# Patient Record
Sex: MALE | Race: WHITE | Employment: FULL TIME | URBAN - METROPOLITAN AREA
[De-identification: names, ages, dates, MRNs, and addresses within clinical notes are randomized per-mention and may not be internally consistent; named-entity substitution may affect disease eponyms.]

---

## 2023-12-05 ENCOUNTER — APPOINTMENT (EMERGENCY)
Dept: RADIOLOGY | Facility: HOSPITAL | Age: 43
End: 2023-12-05
Payer: OTHER MISCELLANEOUS

## 2023-12-05 ENCOUNTER — HOSPITAL ENCOUNTER (EMERGENCY)
Facility: HOSPITAL | Age: 43
Discharge: HOME | End: 2023-12-05
Attending: EMERGENCY MEDICINE
Payer: OTHER MISCELLANEOUS

## 2023-12-05 ENCOUNTER — TELEPHONE (OUTPATIENT)
Age: 43
End: 2023-12-05

## 2023-12-05 ENCOUNTER — APPOINTMENT (OUTPATIENT)
Dept: URGENT CARE | Facility: CLINIC | Age: 43
End: 2023-12-05
Payer: OTHER MISCELLANEOUS

## 2023-12-05 VITALS
DIASTOLIC BLOOD PRESSURE: 84 MMHG | BODY MASS INDEX: 26.41 KG/M2 | HEART RATE: 66 BPM | TEMPERATURE: 98.3 F | WEIGHT: 195 LBS | HEIGHT: 72 IN | SYSTOLIC BLOOD PRESSURE: 184 MMHG | RESPIRATION RATE: 16 BRPM | OXYGEN SATURATION: 100 %

## 2023-12-05 DIAGNOSIS — S60.10XA SUBUNGUAL HEMATOMA OF FINGER, INITIAL ENCOUNTER: Primary | ICD-10-CM

## 2023-12-05 DIAGNOSIS — M79.644 PAIN OF RIGHT THUMB: ICD-10-CM

## 2023-12-05 DIAGNOSIS — S61.309A AVULSION OF FINGERNAIL, INITIAL ENCOUNTER: ICD-10-CM

## 2023-12-05 DIAGNOSIS — R03.0 ELEVATED BLOOD PRESSURE READING: ICD-10-CM

## 2023-12-05 PROCEDURE — 11740 EVACUATION SUBUNGUAL HMTMA: CPT | Performed by: EMERGENCY MEDICINE

## 2023-12-05 PROCEDURE — 73130 X-RAY EXAM OF HAND: CPT

## 2023-12-05 PROCEDURE — 99283 EMERGENCY DEPT VISIT LOW MDM: CPT | Performed by: NURSE PRACTITIONER

## 2023-12-05 PROCEDURE — G0382 LEV 3 HOSP TYPE B ED VISIT: HCPCS | Performed by: NURSE PRACTITIONER

## 2023-12-05 PROCEDURE — 99284 EMERGENCY DEPT VISIT MOD MDM: CPT | Performed by: EMERGENCY MEDICINE

## 2023-12-05 PROCEDURE — 99283 EMERGENCY DEPT VISIT LOW MDM: CPT

## 2023-12-05 RX ORDER — GINSENG 100 MG
1 CAPSULE ORAL ONCE
Status: COMPLETED | OUTPATIENT
Start: 2023-12-05 | End: 2023-12-05

## 2023-12-05 RX ORDER — LIDOCAINE HYDROCHLORIDE 10 MG/ML
5 INJECTION, SOLUTION EPIDURAL; INFILTRATION; INTRACAUDAL; PERINEURAL ONCE
Status: COMPLETED | OUTPATIENT
Start: 2023-12-05 | End: 2023-12-05

## 2023-12-05 RX ADMIN — BACITRACIN 1 SMALL APPLICATION: 500 OINTMENT TOPICAL at 15:50

## 2023-12-05 RX ADMIN — LIDOCAINE HYDROCHLORIDE 5 ML: 10 INJECTION, SOLUTION EPIDURAL; INFILTRATION; INTRACAUDAL; PERINEURAL at 14:55

## 2023-12-05 NOTE — ED PROVIDER NOTES
History  Chief Complaint   Patient presents with    Thumb Injury     Right thumb stuck between tailgate and a barrel. Patient states nail is loose and cuticle is "peeled off"     Patient is a 26-year-old male, with no pertinent medical history, who presents to the ED today shortly after drinking his right thumb. Patient is right-handed. Patient reports associated pain at this location, throbbing in character, as well as subjective numbness on the distal aspect of his right thumb. This injury occurred while at work when he was moving a barrel and his thumb became entrapped between the barrel and the truck bed. Patient has not taken anything to remit his symptoms. Touch exacerbates his discomfort. Patient states that his last tetanus update was 1 year ago. He denies injury or pain elsewhere. Patient is without other concerns at this time. None       History reviewed. No pertinent past medical history. History reviewed. No pertinent surgical history. Family History   Family history unknown: Yes     I have reviewed and agree with the history as documented. E-Cigarette/Vaping     E-Cigarette/Vaping Substances     Social History     Tobacco Use    Smoking status: Never       Review of Systems   Constitutional:  Negative for fever. HENT:  Negative for trouble swallowing. Eyes:  Negative for visual disturbance. Respiratory:  Negative for shortness of breath. Cardiovascular:  Negative for chest pain. Gastrointestinal:  Negative for abdominal pain. Endocrine: Negative for polyuria. Genitourinary:  Negative for dysuria. Musculoskeletal:  Negative for gait problem. Skin:  Negative for rash. Allergic/Immunologic: Negative for environmental allergies. Neurological:  Positive for numbness. Negative for weakness. Hematological:  Negative for adenopathy. Psychiatric/Behavioral:  Negative for confusion. All other systems reviewed and are negative.       Physical Exam  Physical Exam  Vitals and nursing note reviewed. Constitutional:       General: He is not in acute distress. Appearance: He is not ill-appearing, toxic-appearing or diaphoretic. Comments: Patient appears comfortable during my evaluation    HENT:      Head: Normocephalic and atraumatic. Right Ear: External ear normal.      Left Ear: External ear normal.      Nose: Nose normal. No rhinorrhea. Mouth/Throat:      Mouth: Mucous membranes are moist.      Pharynx: Oropharynx is clear. No oropharyngeal exudate or posterior oropharyngeal erythema. Comments: Uvula midline. No oropharyngeal or submandibular mass/swelling  Eyes:      General: No scleral icterus. Right eye: No discharge. Left eye: No discharge. Conjunctiva/sclera: Conjunctivae normal.      Pupils: Pupils are equal, round, and reactive to light. Neck:      Comments: Patient is spontaneously rotating their neck to the left and right during the history and physical exam interaction without difficulty or apparent discomfort    Cardiovascular:      Rate and Rhythm: Normal rate and regular rhythm. Pulses: Normal pulses. Heart sounds: Normal heart sounds. No murmur heard. No friction rub. No gallop. Comments: 2+ Radial  Pulmonary:      Effort: Pulmonary effort is normal. No respiratory distress. Breath sounds: Normal breath sounds. No stridor. No wheezing, rhonchi or rales. Abdominal:      General: Abdomen is flat. There is no distension. Palpations: Abdomen is soft. Tenderness: There is no abdominal tenderness. There is no right CVA tenderness, left CVA tenderness, guarding or rebound. Musculoskeletal:         General: Signs of injury present. No deformity. Cervical back: Neck supple. No tenderness. No muscular tenderness. Comments: Greater than 50% subungual hematoma under the right thumbnail.   Patient had relief of pain after trephination after discussion of risks and benefits. The nail itself has mild avulsion near the cuticle where half the cuticle is destroyed. The nail itself does not lift up off the nailbed. Lymphadenopathy:      Cervical: No cervical adenopathy. Skin:     General: Skin is warm and dry. Capillary Refill: Capillary refill takes less than 2 seconds. Findings: Bruising present. Neurological:      Mental Status: He is alert. Comments: Patient is speaking clearly in complete sentences. Patient is answering appropriately and able follow commands. Patient is moving all four extremities spontaneously. No facial droop. Tongue midline. Intact 2 point discrimination, less than 6 mm, at the distal right thumb.    Psychiatric:         Mood and Affect: Mood normal.         Behavior: Behavior normal.         Vital Signs  ED Triage Vitals [12/05/23 1409]   Temperature Pulse Respirations Blood Pressure SpO2   98.3 °F (36.8 °C) 66 16 (!) 184/84 100 %      Temp Source Heart Rate Source Patient Position - Orthostatic VS BP Location FiO2 (%)   Oral Monitor Sitting Right arm --      Pain Score       8           Vitals:    12/05/23 1409   BP: (!) 184/84   Pulse: 66   Patient Position - Orthostatic VS: Sitting         Visual Acuity      ED Medications  Medications   lidocaine (PF) (XYLOCAINE-MPF) 1 % injection 5 mL (5 mL Infiltration Given 12/5/23 1455)   bacitracin topical ointment 1 small application (1 small application Topical Given 12/5/23 1550)       Diagnostic Studies  Results Reviewed       None                   XR hand 3+ views RIGHT   ED Interpretation by Abigail Bautista MD (12/05 6697)   Per my independent interpretation: No acute osseous abnormality                 Procedures  General Procedure    Date/Time: 12/5/2023 3:53 PM    Performed by: Abigail Bautista MD  Authorized by: Abigail Bautista MD    Patient location:  ED  Assisting Provider(s): No    Consent:     Consent obtained:  Verbal    Consent given by:  Patient Risks discussed:  Bleeding, infection, pain, incomplete drainage and poor cosmetic result    Alternatives discussed:  Alternative treatment  Indications:     Indications:  Subungual hematoma, finger injury  Pre-procedure details:     Skin preparation:  ChloraPrep    Preparation: Patient was prepped and draped in the usual sterile fashion    Anesthesia (see MAR for exact dosages): Anesthesia method:  Local infiltration    Local anesthetic:  Lidocaine 1% w/o epi  Procedure Detail:     Equipment used:  Suture kit    Procedure note (site, laterality, method, findings):  Right index finger, nail trephination, nail suture, digital block    Post-procedure details:     Patient tolerance of procedure: Tolerated well, no immediate complications           ED Course  ED Course as of 12/05/23 1600   Tue Dec 05, 2023   9871 Results reviewed with patient. After extensive discussion with patient regarding options after trephination, including nailbed removal versus suturing of the nailbed proximal to where the cuticle would be for anchoring, and discussion of risks including nail loss/deformity, patient in agreement with proceeding with out removal of nail at this time as bleeding is controlled and nail itself is not able to be lifted off the nail bed. Will refer to occupational medicine, hand surgery. 1600 No active bleeding coming from the trephination around the nail. Results reviewed with patient. Questions answered to his satisfaction. Medical Decision Making  Patient is a 79-year-old male, with no pertinent medical history, who presents to the ED today shortly after drinking his right thumb. Patient is right-handed. Patient reports associated pain at this location, throbbing in character, as well as subjective numbness on the distal aspect of his right thumb.   This injury occurred while at work when he was moving a barrel and his thumb became entrapped between the barrel and the truck bed. Patient has not taken anything to remit his symptoms. Touch exacerbates his discomfort. Patient states that his last tetanus update was 1 year ago. He denies injury or pain elsewhere. Patient is currently afebrile and hemodynamically stable. His physical exam is normal for clear heart lungs auscultation, soft nontender abdomen, intact 2 point discrimination on the distal right thumb, good capillary refill, greater than 50% subungual hematoma, partially avulsed nail. This presentation is concerning for: Fracture, nailbed laceration, subungual hematoma, crush injury. Will investigate with x-ray imaging. Will manage with digital block, nailbed repair, further based upon workup. No need for tetanus update as patient is up-to-date on tetanus vaccination. Will refer to hand surgery and occupational medicine. Amount and/or Complexity of Data Reviewed  Radiology: ordered and independent interpretation performed. Risk  Prescription drug management.              Disposition  Final diagnoses:   Elevated blood pressure reading   Pain of right thumb   Subungual hematoma of finger, initial encounter   Avulsion of fingernail, initial encounter     Time reflects when diagnosis was documented in both MDM as applicable and the Disposition within this note       Time User Action Codes Description Comment    12/5/2023  2:46 PM Barnet Horse A Add [R03.0] Elevated blood pressure reading     12/5/2023  2:46 PM Barnet Horse Add [Y09.017] Pain of right thumb     12/5/2023  2:46 PM Teto Sanchez A Add [S60.10XA] Subungual hematoma of finger, initial encounter     12/5/2023  2:46 PM Barnet Horse A Add [S91.209A] Avulsion of toenail, initial encounter     12/5/2023  2:47 PM Barnet Horse A Remove [S91.209A] Avulsion of toenail, initial encounter     12/5/2023  2:47 PM Teto Sanchez A Add [S61.309A] Avulsion of fingernail, initial encounter     12/5/2023  3:55 PM Ada Sham Modify [R03.0] Elevated blood pressure reading     12/5/2023  3:55 PM Ada Sham Modify [W04.703] Pain of right thumb     12/5/2023  3:55 PM Ada Sham Modify [B19.528] Pain of right thumb     12/5/2023  3:55 PM LegKandi benítez Creed A Modify [S60.10XA] Subungual hematoma of finger, initial encounter           ED Disposition       ED Disposition   Discharge    Condition   Stable    Date/Time   Tue Dec 5, 2023  3:56 PM    Comment   Ezequiel Jara discharge to home/self care.                    Follow-up Information       Follow up With Specialties Details Why Contact Info Additional [address] [facility] Schedule an appointment as soon as possible for a visit   [facility] [id number]  [address], [address] [place] [address], [address] [place] [address]    Norman Magana MD Orthopedic Surgery, Hand Surgery Schedule an appointment as soon as possible for a visit   [address]  Schedule an appointment as soon as possible for a visit   [address] [address]  [phone]             Patient's Medications    No medications on file           PDMP Review       None            ED Provider  Electronically Signed by             Gloria Chen MD  12/05/23 9985

## 2023-12-05 NOTE — TELEPHONE ENCOUNTER
Hello,  Please advise if the following patient can be forced onto the schedule:    Patient: Blake Argueta    : 1980    MRN: [id number]    Call back #: [phone]    Insurance: [facility]    Reason for appointment: Right thumb injury 23, ED visit and xrays in chart, referral to hand surgeon. Attempted to schedule with Dr Rita Davis but first available NP spot not until January. Requested doctor/location: Trey Gardner      Thank you.

## 2023-12-05 NOTE — DISCHARGE INSTRUCTIONS
You were evaluated in the emergency department for: right thumb pain. You can access your current and pending results through [facility]. A radiologist will take a second look at your X-Rays, if you had any, and you will be contacted with any new findings. You should follow-up with your primary care provider, as soon as possible, for re-evaluation. If you do not have a primary care provider, I have referred you to [facility]. You will be contacted about scheduling an appointment. Their phone number is also included on this paperwork. You have also been referred to occupational medicine the hand surgery and you should follow-up with them as well. You may take 650mg of tylenol every four to six hours, not exceeding 3,000mg daily, for the management of your discomfort. You may also take ibuprofen, 400mg every six to eight hours. Your workup revealed no emergent features at this time; however, many disease processes are dynamic:    Please, return to the emergency department if you experience new or worsening symptoms, fever, chest pain, shortness of breath, difficulty breathing, dizziness, abdominal pain, persistent nausea/vomiting, syncope or passing out, blood in your urine or stool, coughing up blood, leg swelling/pain, urinary retention, bowel or bladder incontinence, numbness between your legs. Additionally, your blood pressure was measured to be high. This is something that you should discuss with your primary care provider and have re-checked within one week.

## 2023-12-05 NOTE — Clinical Note
Georgiashannan Sonia was seen and treated in our emergency department on 12/5/2023. Diagnosis:     Sadel  . He may return on this date: 12/08/2023         If you have any questions or concerns, please don't hesitate to call.       Marine Jennings MD    ______________________________           _______________          _______________  Hospital Representative                              Date                                Time

## 2023-12-07 VITALS — BODY MASS INDEX: 26.41 KG/M2 | WEIGHT: 195 LBS | HEIGHT: 72 IN

## 2023-12-07 DIAGNOSIS — S61.309A AVULSION OF FINGERNAIL, INITIAL ENCOUNTER: ICD-10-CM

## 2023-12-07 DIAGNOSIS — M79.644 PAIN OF RIGHT THUMB: ICD-10-CM

## 2023-12-07 DIAGNOSIS — S60.10XA SUBUNGUAL HEMATOMA OF FINGER, INITIAL ENCOUNTER: ICD-10-CM

## 2023-12-07 PROCEDURE — 99204 OFFICE O/P NEW MOD 45 MIN: CPT | Performed by: STUDENT IN AN ORGANIZED HEALTH CARE EDUCATION/TRAINING PROGRAM

## 2023-12-07 RX ORDER — MELOXICAM 7.5 MG/1
7.5 TABLET ORAL DAILY
Qty: 14 TABLET | Refills: 0 | Status: SHIPPED | OUTPATIENT
Start: 2023-12-07

## 2023-12-07 NOTE — LETTER
December 7, 2023     Patient: Indira Abarca  YOB: 1980  Date of Visit: 12/7/2023      To Whom it May Concern:    Indira Abarca is under my professional care. April Howell was seen in my office on 12/7/2023. Ezequiel should remain out of work until his follow up with me in 7-10 days. Work status will be reevaluated at that time. If you have any questions or concerns, please don't hesitate to call.          Sincerely,          Stefania Crawford MD        CC: No Recipients

## 2023-12-07 NOTE — PROGRESS NOTES
ORTHOPAEDIC HAND, WRIST, AND ELBOW OFFICE  VISIT      ASSESSMENT/PLAN:      Diagnoses and all orders for this visit:    Pain of right thumb  -     Ambulatory Referral to Hand Surgery    Subungual hematoma of finger, initial encounter  -     Ambulatory Referral to Hand Surgery  -     meloxicam (Mobic) 7.5 mg tablet; Take 1 tablet (7.5 mg total) by mouth daily    Avulsion of fingernail, initial encounter  -     Ambulatory Referral to Hand Surgery  -     meloxicam (Mobic) 7.5 mg tablet; Take 1 tablet (7.5 mg total) by mouth daily          39 y.o. male with right thumb crush injury  Pathology of these injuries discussed. Because pt has no fx on xray and his nail is well-adhered, we discussed foregoing nail removal at this time. Treatment options and expected outcomes were discussed. The patient verbalized understanding of exam findings and treatment plan. The patient was given the opportunity to ask questions. Questions were answered to the patient's satisfaction. We will have the patient clean the area with soap and water and then recover with Band-Aids. 2lb weight limit while this heals  Provided with a splint to use as needed for protection      Follow Up:  10 days       To Do Next Visit:  Re-evaluation of current issue      Discussions: The anatomy and physiology of the diagnosis(es) was(were) discussed with the patient today in the office. Treatment options and recommendations were discussed, as well as expected future outcomes. Norman Magana MD  Attending, Orthopaedic Surgery  Hand, Wrist, and Elbow Surgery  [address] [place] [address]    ______________________________________________________________________________________________    CHIEF COMPLAINT:  Chief Complaint   Patient presents with   • Right Hand - Pain       SUBJECTIVE:  Patient is a 39 y.o. RHD male who presents today for evaluation and treatment of right thumb injury.  Pt states this occurred at work while he was moving a barrel and his thumb became entrapped between the barrel and a truck bed. Pt states intimally he had numbness, but this has resolved. He did have trephination performed in the ED which provided some relief. Occupation:  with [address]     I have personally reviewed all the relevant PMH, PSH, SH, FH, Medications and allergies      PAST MEDICAL HISTORY:  History reviewed. No pertinent past medical history. PAST SURGICAL HISTORY:  History reviewed. No pertinent surgical history. FAMILY HISTORY:  Family History   Family history unknown: Yes       SOCIAL HISTORY:  Social History     Tobacco Use   • Smoking status: Never       MEDICATIONS:    Current Outpatient Medications:   •  meloxicam (Mobic) 7.5 mg tablet, Take 1 tablet (7.5 mg total) by mouth daily, Disp: 14 tablet, Rfl: 0    ALLERGIES:  No Known Allergies        REVIEW OF SYSTEMS:  Musculoskeletal:        As noted in HPI. All other systems reviewed and are negative. VITALS:  There were no vitals filed for this visit. LABS:  HgA1c:   Lab Results   Component Value Date    HGBA1C 5.1 08/26/2023     BMP: No results found for: "GLUCOSE", "CALCIUM", "NA", "K", "CO2", "CL", "BUN", "CREATININE"    _____________________________________________________  PHYSICAL EXAMINATION:  General: Well developed and well nourished, alert & oriented x 3, appears comfortable  Psychiatric: Normal  HEENT: Normocephalic, Atraumatic Trachea Midline, No torticollis  Pulmonary: No audible wheezing or respiratory distress   Abdomen/GI: Non tender, non distended   Cardiovascular: No pitting edema, 2+ radial pulse   Skin: No Masses, No Erythema, No Fluctuation, No Ulcerations  Neurovascular: Sensation Intact to the Median, Ulnar, Radial Nerve, Motor Intact to the Median, Ulnar, Radial Nerve, and Pulses Intact  Musculoskeletal: Normal, except as noted in detailed exam and in HPI. MUSCULOSKELETAL EXAMINATION:  Right Thumb:  Pt with subungual hematoma.   He has one suture located around the ulnar portion of the skin with some skin fraying here - this was trimmed. No erythema. FPL/EPL intact. Finger well perfused. ___________________________________________________  STUDIES REVIEWED:  Xrays of the right hand were reviewed and independently interpreted in PACS by Dr. Rakesh Crawford and demonstrate no acute osseous abnormalities.           PROCEDURES PERFORMED:  Procedures  No Procedures performed today    _____________________________________________________      Scribe Attestation    I,:  Marjorie Pavon PA-C am acting as a scribe while in the presence of the attending physician.:       I,:  Kraig Fairas MD personally performed the services described in this documentation    as scribed in my presence.:

## 2023-12-08 ENCOUNTER — TELEPHONE (OUTPATIENT)
Age: 43
End: 2023-12-08

## 2023-12-08 NOTE — TELEPHONE ENCOUNTER
Caller: W/C    Doctor/Office: Jonatan      What needs to be faxed: Office notes and work note    ATTN to: [phone]     Fax#: [phone]      Documents were successfully e-faxed

## 2023-12-15 VITALS
DIASTOLIC BLOOD PRESSURE: 90 MMHG | HEIGHT: 72 IN | WEIGHT: 198.6 LBS | SYSTOLIC BLOOD PRESSURE: 177 MMHG | HEART RATE: 61 BPM | BODY MASS INDEX: 26.9 KG/M2

## 2023-12-15 DIAGNOSIS — S60.10XA SUBUNGUAL HEMATOMA OF FINGER, INITIAL ENCOUNTER: Primary | ICD-10-CM

## 2023-12-15 DIAGNOSIS — S61.309A AVULSION OF FINGERNAIL, INITIAL ENCOUNTER: ICD-10-CM

## 2023-12-15 PROCEDURE — 99213 OFFICE O/P EST LOW 20 MIN: CPT | Performed by: STUDENT IN AN ORGANIZED HEALTH CARE EDUCATION/TRAINING PROGRAM

## 2023-12-15 RX ORDER — AMLODIPINE AND OLMESARTAN MEDOXOMIL 5; 20 MG/1; MG/1
0.5 TABLET ORAL DAILY
COMMUNITY
Start: 2023-12-12

## 2023-12-15 NOTE — LETTER
December 15, 2023     Patient: Indira Abarca  YOB: 1980  Date of Visit: 12/15/2023      To Whom it May Concern:    Indira Abarca is under my professional care. April Howell was seen in my office on 12/15/2023. April Howell may return to work on 12/18/23 full duty, no restrictions. If you have any questions or concerns, please don't hesitate to call.          Sincerely,          Stefania Crawford MD        CC: No Recipients

## 2023-12-15 NOTE — PROGRESS NOTES
ORTHOPAEDIC HAND, WRIST, AND ELBOW OFFICE  VISIT      ASSESSMENT/PLAN:      Diagnoses and all orders for this visit:    Subungual hematoma of finger, initial encounter  -     Suture removal    Avulsion of fingernail, initial encounter    Other orders  -     amlodipine-olmesartan (NATASHA) 5-20 MG; Take 0.5 tablets by mouth daily          39 y.o. male with right thumb crush injury sustained 1.5-2 weeks ago  Pt overall is doing well. No build-up of subungual hematoma. Pt instructed he can continue with activities as tolerated. No longer has to keep this area covered  He would like to RTW full duty Monday as planned and he will call if he's having any issues      Follow Up:  Pt chooses GI Berg MD  Attending, Orthopaedic Surgery  Hand, Wrist, and Elbow Surgery  [address] [place] [address]    ______________________________________________________________________________________________    CHIEF COMPLAINT:  Chief Complaint   Patient presents with   • Right Hand - Follow-up       SUBJECTIVE:  Patient is a 39 y.o. RHD male who presents today for follow up of right thumb crush injury sustained at work 1.5-2 weeks ago. Pt states his thumb is feeling better overall. He has kept it covered and clean. Occupation:       I have personally reviewed all the relevant PMH, PSH, SH, FH, Medications and allergies      PAST MEDICAL HISTORY:  History reviewed. No pertinent past medical history. PAST SURGICAL HISTORY:  History reviewed. No pertinent surgical history.     FAMILY HISTORY:  Family History   Family history unknown: Yes       SOCIAL HISTORY:  Social History     Tobacco Use   • Smoking status: Never       MEDICATIONS:    Current Outpatient Medications:   •  amlodipine-olmesartan (NATASHA) 5-20 MG, Take 0.5 tablets by mouth daily, Disp: , Rfl:   •  meloxicam (Mobic) 7.5 mg tablet, Take 1 tablet (7.5 mg total) by mouth daily, Disp: 14 tablet, Rfl: 0    ALLERGIES:  No Known Allergies        REVIEW OF SYSTEMS:  Musculoskeletal:        As noted in HPI. All other systems reviewed and are negative. VITALS:  Vitals:    12/15/23 1307   BP: (!) 177/90   Pulse: 61       LABS:  HgA1c:   Lab Results   Component Value Date    HGBA1C 5.1 08/26/2023     BMP: No results found for: "GLUCOSE", "CALCIUM", "NA", "K", "CO2", "CL", "BUN", "CREATININE"    _____________________________________________________  PHYSICAL EXAMINATION:  General: Well developed and well nourished, alert & oriented x 3, appears comfortable  Psychiatric: Normal  HEENT: Normocephalic, Atraumatic Trachea Midline, No torticollis  Pulmonary: No audible wheezing or respiratory distress   Abdomen/GI: Non tender, non distended   Cardiovascular: No pitting edema, 2+ radial pulse   Skin: No masses, erythema, lacerations, fluctation, ulcerations  Neurovascular: Sensation Intact to the Median, Ulnar, Radial Nerve, Motor Intact to the Median, Ulnar, Radial Nerve, and Pulses Intact  Musculoskeletal: Normal, except as noted in detailed exam and in HPI. MUSCULOSKELETAL EXAMINATION:  Right Thumb:  Healing subungual hematoma. Suture removed today. No erythema, drainage or evidence of infx. FPL/EPL intact. Finger well perfused. ___________________________________________________  STUDIES REVIEWED:  Reviewed prior ER note and right hand x-rays        PROCEDURES PERFORMED:  Suture removal    Date/Time: 12/15/2023 1:00 PM    Performed by: Monica Casanova MD  Authorized by: Monica Casanova MD  [place] Protocol:  Consent: Verbal consent obtained. Risks and benefits: risks, benefits and alternatives were discussed  Consent given by: patient  Time out: Immediately prior to procedure a "time out" was called to verify the correct patient, procedure, equipment, support staff and site/side marked as required.   Site marked: the operative site was marked      Location:     Laterality:  Right    Location:  Upper extremity    Upper extremity location:  Hand    Hand location:  R thumb  Procedure details: Tools used:  Suture removal kit    Wound appearance:  No sign(s) of infection  Post-procedure details:     Post-removal:  Band-Aid applied    Patient tolerance of procedure:   Tolerated well, no immediate complications    No Procedures performed today    _____________________________________________________      Scribe Attestation    I,:  Delgado Lees PA-C am acting as a scribe while in the presence of the attending physician.:       I,:  [facility] MD Silva personally performed the services described in this documentation    as scribed in my presence.:

## 2023-12-18 ENCOUNTER — TELEPHONE (OUTPATIENT)
Age: 43
End: 2023-12-18

## 2023-12-18 NOTE — TELEPHONE ENCOUNTER
Caller: urszula    Doctor: anahy    Reason for call: Jose Luis called for the office and work notes from 12/7 and 12/15 to be faxed over    Fax # [phone]     Office and work notes from both days were electronically faxed    Call back#: [phone]